# Patient Record
Sex: MALE | Race: WHITE
[De-identification: names, ages, dates, MRNs, and addresses within clinical notes are randomized per-mention and may not be internally consistent; named-entity substitution may affect disease eponyms.]

---

## 2019-02-11 ENCOUNTER — HOSPITAL ENCOUNTER (OUTPATIENT)
Dept: HOSPITAL 59 - SUR | Age: 74
Discharge: HOME | End: 2019-02-11
Attending: ORTHOPAEDIC SURGERY
Payer: MEDICARE

## 2019-02-11 DIAGNOSIS — M77.8: ICD-10-CM

## 2019-02-11 DIAGNOSIS — I10: ICD-10-CM

## 2019-02-11 DIAGNOSIS — M75.102: Primary | ICD-10-CM

## 2019-02-11 DIAGNOSIS — K21.9: ICD-10-CM

## 2019-02-11 LAB
ANION GAP SERPL CALC-SCNC: 10 MMOL/L (ref 7–16)
BASOPHILS NFR BLD: 0.6 % (ref 0–6)
BUN SERPL-MCNC: 21 MG/DL (ref 8–23)
CO2 SERPL-SCNC: 29 MMOL/L (ref 22–29)
CREAT SERPL-MCNC: 1.4 MG/DL (ref 0.7–1.2)
EOSINOPHIL NFR BLD: 4.8 % (ref 0–6)
ERYTHROCYTE [DISTWIDTH] IN BLOOD BY AUTOMATED COUNT: 15 % (ref 11.5–14.5)
EST GLOMERULAR FILTRATION RATE: 53 ML/MIN
GLUCOSE SERPL-MCNC: 103 MG/DL (ref 74–109)
GRANULOCYTES NFR BLD: 67 % (ref 47–80)
HCT VFR BLD CALC: 44.7 % (ref 42–52)
HGB BLD-MCNC: 14.8 GM/DL (ref 14–18)
LYMPHOCYTES NFR BLD AUTO: 20.7 % (ref 16–45)
MCH RBC QN AUTO: 28.5 PG (ref 27–33)
MCHC RBC AUTO-ENTMCNC: 33.1 G/DL (ref 32–36)
MCV RBC AUTO: 86 FL (ref 81–97)
MONOCYTES NFR BLD: 6.9 % (ref 0–9)
PLATELET # BLD: 258 K/UL (ref 130–400)
PMV BLD AUTO: 10.9 FL (ref 7.4–10.4)
RBC # BLD AUTO: 5.2 M/UL (ref 4.4–5.7)
WBC # BLD AUTO: 6.2 K/UL (ref 4.2–12.2)

## 2019-02-11 PROCEDURE — 29824 SHO ARTHRS SRG DSTL CLAVICLC: CPT

## 2019-02-11 PROCEDURE — 85025 COMPLETE CBC W/AUTO DIFF WBC: CPT

## 2019-02-11 PROCEDURE — 29826 SHO ARTHRS SRG DECOMPRESSION: CPT

## 2019-02-11 PROCEDURE — 76942 ECHO GUIDE FOR BIOPSY: CPT

## 2019-02-11 PROCEDURE — 29827 SHO ARTHRS SRG RT8TR CUF RPR: CPT

## 2019-02-11 PROCEDURE — 64415 NJX AA&/STRD BRCH PLXS IMG: CPT

## 2019-02-11 PROCEDURE — 01630 ANES OPN/ARTHR PX SHO JT NOS: CPT

## 2019-02-11 PROCEDURE — 80048 BASIC METABOLIC PNL TOTAL CA: CPT

## 2019-02-11 NOTE — OPERATIVE NOTE
DATE OF SURGERY:  02/11/2019



PREOPERATIVE DIAGNOSES:  

LEFT SHOULDER IMPINGEMENT TENDINITIS, POSSIBLE ROTATOR CUFF TEAR. AC JOINT 
ARTHROSIS.



POSTOPERATIVE DIAGNOSES: 

LEFT SHOULDER IMPINGEMENT TENDINITIS, POSSIBLE ROTATOR CUFF TEAR, AC JOINT 
ARTHROSIS. PARTIAL THICKNESS ROTATOR CUFF TEAR. 



PROCEDURE:  

1.  DIAGNOSTIC ARTHROSCOPY.

2.  ARTHROSCOPIC ACROMIOPLASTY WITH SUBACROMIAL DECOMPRESSION. 

3.  ARTHROSCOPIC EXCISION OF THE DISTAL CLAVICLE, AC JOINT 1 CM. 

4.  ARTHROSCOPIC PARTIAL THICKNESS ROTATOR CUFF REPAIR.



SURGEON:  JANNA JENKINS M.D.



ANESTHESIA:  GENERAL ENDOTRACHEAL INTERSCALENE BLOCK, JUANITO ERAZO.



COMPLICATIONS:  NONE. 



ESTIMATED BLOOD LOSS: MINIMAL. 



OPERATIVE FINDINGS:  A high-grade partial thickness tear involving 70% of the 
width of the tendon insertion. Bone-on-bone AC joint arthrosis. 



COMPONENTS PLACED:  [employer] Nephew 4.75 mm Regenesorb anchor with two #2 
Ultrabraid sutures. 



INDICATION FOR OPERATIONS:  This is a 73-year-old male who has had persistent 
pain and dysfunction in the shoulder for several year, failed nonoperative 
treatment. He has had ultrasounds of both shoulders that showed a partial 
thickness cuff tear and is scheduled for the procedures above. I explained the 
risks and benefits of surgery in detail for the diagnoses and procedures 
including but not limited to infection, nerve injury, vessel injury, persistent 
pain, stiffness, numbness and tingling in his shoulder, retear rotator cuff, 
need for further procedures, and all of his questions were answered. Rehab and 
course were outlined and he agreed to proceed. 



PROCEDURE:  The patient was brought to the O.R. and placed in the beach chair 
position for the proper surgery. Endotracheal anesthesia was induced and his 
left upper extremity and shoulder were prepped and draped in sterile fashion. 
The left shoulder was prepped again with ChloraPrep after draped. 
Intraoperative time-out was performed. 



Next, the glenohumeral joint, subacromial space, and AC joint were injected 
with 0.5% Marcaine with Epinephrine. A standard posterior arthroscopic portal 
was established 2 cm inferior and 1 cm medial to the posterolateral corner of 
the acromion.  An anterior portal was established with the rotator interval 
under direct visualization and diagnostic arthroscopy was performed.



The biceps tendon and biceps anchor was normal. The biceps anchor and superior 
labrum was frayed, type 1 SLAP lesion. Posterior superior labrum again frayed. 
Posterior inferior labrum was frayed but not completely torn. Axillary recess 
was normal. 



Glenohumeral head and articular cartilage were normal. 



The undersurface of the rotator cuff was thoroughly inspected. There was a 
significant partial articular surface tendon tear involving 60-70% with tendon 
insertion. This was immediately debrided with a shaver to the articular margin 
lightly to the bleeding bone surface in preparation for rotator cuff repair. 



The barrier was normal. The superior and middle glenohumeral ligaments were 
intact. 



The subscap tendon and subscap recess were normal. Anterior inferior labrum 
normal. 



We used a shaver to debride the type 1 SLAP lesion labrum as well as the 
partial thickness cuff and greater tuberosity were repaired. 



Next, anterior and posterior subacromial portals were established. The tissue 
ablator was inserted in the anterior subacromial portal. A subacromial 
bursectomy was performed outlining the anterolateral of the acromion. The 
coracoclavicular ligament was completely opened up the inferior AC joint 
capsule. 



Next, the lateral portal was established off the posterior margin of the AC 
joint. A 5 x 5 mm bur was inserted there. We took off strips of bone working 
from lateral to medial, anterior to posterior, converting the type II acromion 
to a planar surface. We used a rasp to smooth the subacromial surface and 
verify it was flat with a probe from the posterior portal.



Next, we inserted the bur in the anterior portal down the medial acromial facet
, resected this 1 cm. Made a small stab incisions superior of the AC joint. We 
inserted the shaver there and smoothed both bony surfaces verifying the AC 
joint was completely free of any bony impingement or bony fragments. 



Next, resected the bursa around the periphery of the rotator cuff, thoroughly 
inspected that and that was intact with passive internal and external rotation 
of the shoulder. 



Next, we established an accessory anterior lateral portal, inserted a 
disposable cannula there and then abducted, adducted rotated the shoulder and 
then localized entry point for the rotator cuff anchor off the acromial edge. 
We made a spinal insertion and made a small stab incision, inserted the punch 
tap at the greater tuberosity bone bed at the articular margin then we inserted 
an anchor buried beneath the surface of bone. 



Next, we placed one limb of each set of suture into the joint then penetrate 
through full-thickness healthy soft tissue immune to the partial flap tearing 
covering the entire surface with two mattress sutures. We then tied those down 
over the top using a taut line hitch, slide and lock knot, backed up with three 
reverse hitch alternating post throws. Sutures were cut above the knot with a 
cutter. Knots were probed and staples were secured.



Under direct visualization intra-articularly, rotator cuff footprint was nicely 
re-established and the cuff was down tight to the greater tuberosity. 



This completed our procedures. Scope and equipment were removed. The scope 
incisions were covered with Xeroform gauze. Sterile dressing applied and 
UltraSling.



The patient tolerated the procedures well. No intraoperative complications. All 
sponge, needle, and blade counts correct.  Recovery room stable, 
neurovascularly intact. He will be discharged as an outpatient with [facility] Nurse and follow-up in two weeks.  



cc: Dr. Edu Jc - [place], [place]



JOB NUMBER:  [id number]
MTDD

## 2019-04-15 ENCOUNTER — HOSPITAL ENCOUNTER (OUTPATIENT)
Dept: HOSPITAL 59 - SUR | Age: 74
Discharge: HOME | End: 2019-04-15
Attending: ORTHOPAEDIC SURGERY
Payer: MEDICARE

## 2019-04-15 DIAGNOSIS — M19.011: ICD-10-CM

## 2019-04-15 DIAGNOSIS — I10: ICD-10-CM

## 2019-04-15 DIAGNOSIS — K21.9: ICD-10-CM

## 2019-04-15 DIAGNOSIS — M75.101: Primary | ICD-10-CM

## 2019-04-15 NOTE — OPERATIVE NOTE
DATE OF SURGERY:  04/15/2019



PREOPERATIVE DIAGNOSES:  

1. RIGHT SHOULDER ROTATOR CUFF TEAR. 

2. AC JOINT ARTHROSIS. 



POSTOPERATIVE DIAGNOSES: 

1. RIGHT SHOULDER ROTATOR CUFF TEAR. 

2. AC JOINT ARTHROSIS.  



PROCEDURE:  

1.  DIAGNOSTIC ARTHROSCOPY.

2.  ARTHROSCOPIC ACROMIOPLASTY WITH SUBACROMIAL DECOMPRESSION. 

3.  ARTHROSCOPIC EXCISION OF THE DISTAL CLAVICLE 1 CM. 

4.  ARTHROSCOPIC ROTATOR CUFF REPAIR.



SURGEON:  JANNA JENKINS M.D.



ANESTHESIA:  GENERAL ENDOTRACHEAL, KACEY, CRNA.



COMPLICATIONS:  NONE. 



ESTIMATED BLOOD LOSS: MINIMAL. 



OPERATIVE FINDINGS:  A medium full-thickness rotator cuff tear in the 
supraspinatus tendon.   



COMPONENTS PLACED:  One [employer]hew 5.5 mm Regenesorb anchor loaded with two 
#2 Ultrabraid sutures and one [employer] Nephew MultiFix anchor loaded with four 
limbs and medial suture. 



INDICATION FOR OPERATIONS:  This is a 73-year-old male who has had persistent 
pain and dysfunction in the shoulder for several years. He failed nonoperative 
treatment. Ultrasound showed a rotator cuff tear and is scheduled for the 
procedure above. I explained the risks and benefits thoroughly in detail for 
the diagnoses and procedures including but not limited to infection, nerve 
injury, vessel injury, persistent pain, stiffness, numbness and tingling in his 
shoulder, retear of his rotator cuff, need for further procedures, and all of 
his questions were answered. Rehab and course were outlined and he agreed to 
proceed. 



PROCEDURE:  The patient was brought to the O.R. and placed in the beach chair 
position. Antibiotics were given prior to surgery. General endotracheal 
anesthesia was induced. The right upper extremity and shoulder were prepped and 
draped in sterile fashion. Prepped again with ChloraPrep after draped and time-
out was performed. 



The glenohumeral joint, subacromial space, and AC joint were injected with 0.5% 
Marcaine with Epinephrine. A standard posterior arthroscopic portal was 
established 2 cm inferior and 1 cm medial to the posterolateral corner of the 
acromion.  An anterior portal was established ________________ under direct 
visualization. Diagnostic arthroscopy was performed.



The biceps tendon was torn. The biceps anchor, anterior and posterior superior 
labrum were normal and some synovitis anteriorly. 



The posterior superior labrum was normal. 



Axillary recess normal.



The posterior inferior labrum normal.  



Glenohumeral head and articular cartilage were normal. 



The undersurface of the rotator cuff was inspected. There was a medium full-
thickness tear in the supraspinatus tendon but the posterior portion of the 
cuff was normal. 



Superior and middle glenohumeral ligaments were intact. Subscap tendons and 
subscap recess were normal. Anterior inferior labrum and glenohumeral ligament 
looks normal. 



Next, the anterior and posterior subacromial portals were established. The 
tissue ablator was inserted in the anterior subacromial portal. Subacromial 
bursectomy was performed outlining the anterior lateral edge of the acromion. 
We released the coracoacromial ligament and ultimately opened up the inferior 
AC joint capsule.  



Next, the lateral portal was established off the posterior margin of the AC 
joint. A 5.5 mm bur was inserted there, took off strips of bone from lateral 
medial, anterior to posterior, converting the type II acromion to a planar 
surface. We used a rasp to smooth the subacromial surface and verified it was 
flat with a probe from the posterior portal.



Next, we inserted the bur in the anterior portal and burred down the medial 
acromial facet, resected the distal cuff 1 cm. We made a small stab incision 
superior to the AC joint, inserted the shaver there and smoothed both bony 
surfaces verifying the AC joint was completely free of any bony impingement or 
bony fragments. 



Next, we inspected the bursa and cuff again. There was a medium full-thickness 
tear in the supraspinatus. Established an accessory anterior lateral portal. 
Inserted a shaver there and debrided back the greater tuberosity around the 
edge preparing the bone bed for repair. 



Next, we inserted the anchor through that same accessory anterolateral portal. 
Using the punch tap at the articular margin, inserted the anchor buried beneath 
the surface of bone. 



Next, we passed one limb of each set of suture through the rotator cuff about 2 
cm medial to the edge using a curve penetrator working from anterior to 
posterior covering the entire surface with two mattress sutures. 



We then tied those down over the top using a taut-line hitch, sliding knot and 
backed it up to reverse it down the posterior rows. 



Next, we then loaded those on the MultiFix anchor eyelet and pulled the sutures 
through the eyelet. Then we tapped the eyelet down to the start of the anchor 
and tensioned the sutures and screwed the anchor buried beneath the surface of 
bone. This tucked down the cuff edge nicely. 



We probed and the sutures were stable and secure and the footprint was nicely re
-established. Directly visualized intra-articularly again the footprint was 
nicely re-established.



This completed our procedures. Scope and equipment were removed. The scope 
incisions were covered with Xeroform gauze, sterile dressing was applied, ice, 
and UltraSling. 



The patient tolerated the procedure well. No intraoperative complication. All 
sponge, blade, and needle counts were correct. Recovery Room stable, 
neurovascularly intact and discharged as an outpatient and will follow-up in 
two weeks. Have home therapy nurse, Bear. 





cc: Dr. Edu Jc, [place], [place].

JOB NUMBER:  [id number]
MTDD